# Patient Record
Sex: FEMALE | Race: WHITE | URBAN - METROPOLITAN AREA
[De-identification: names, ages, dates, MRNs, and addresses within clinical notes are randomized per-mention and may not be internally consistent; named-entity substitution may affect disease eponyms.]

---

## 2017-08-21 ENCOUNTER — THERAPY VISIT (OUTPATIENT)
Dept: CHIROPRACTIC MEDICINE | Facility: CLINIC | Age: 52
End: 2017-08-21
Payer: COMMERCIAL

## 2017-08-21 DIAGNOSIS — M77.11 LATERAL EPICONDYLITIS OF RIGHT ELBOW: ICD-10-CM

## 2017-08-21 DIAGNOSIS — M99.02 THORACIC SEGMENT DYSFUNCTION: Primary | ICD-10-CM

## 2017-08-21 DIAGNOSIS — M79.10 MYALGIA: ICD-10-CM

## 2017-08-21 DIAGNOSIS — M25.521 RIGHT ELBOW PAIN: ICD-10-CM

## 2017-08-21 PROCEDURE — 99212 OFFICE O/P EST SF 10 MIN: CPT | Mod: 25 | Performed by: CHIROPRACTOR

## 2017-08-21 PROCEDURE — 97110 THERAPEUTIC EXERCISES: CPT | Performed by: CHIROPRACTOR

## 2017-08-21 PROCEDURE — 98940 CHIROPRACT MANJ 1-2 REGIONS: CPT | Mod: AT | Performed by: CHIROPRACTOR

## 2017-08-21 NOTE — MR AVS SNAPSHOT
After Visit Summary   8/21/2017    Emily Leon    MRN: [id number]           Patient Information     Date Of Birth          1965        Visit Information        Provider Department      8/21/2017 11:45 AM José Luis Galicia DC [facility] [place] Chirop[facility]        Today's Diagnoses     Thoracic segment dysfunction    -  1    Right elbow pain        Myalgia        Lateral epicondylitis of right elbow           Follow-ups after your visit        Your next 10 appointments already scheduled     Aug 29, 2017  9:30 AM CDT   KIMANI Chiropractor with José Luis Galicia DC   [facility] [place] Chirop[facility] ([facility] [place])    [address] [place] [address]  [address]  [place] [address]   [phone]            Sep 05, 2017  8:45 AM CDT   KIMANI Chiropractor with José Luis Galicia DC   [facility] [place] ([facility] [place])    [address] [place] [address]  [id number]  [place] [address]   [phone]              Who to contact     If you have questions or need follow up information about today's clinic visit or your schedule please [facility] directly at [phone].  Normal or non-critical lab and imaging results will be communicated to you by [employer], letter or phone within 4 business days after the clinic has received the results. If you do not hear from us within 7 days, please contact the clinic through [employer] or phone. If you have a critical or abnormal lab result, we will notify you by phone as soon as possible.  Submit refill requests through [employer] or call your pharmacy and they will forward the refill request to us. Please allow 3 business days for your refill to be completed.          Additional Information About Your Visit        [employer] Information     [employer] lets you send messages to your doctor, view your test results, renew your prescriptions,  "schedule appointments and more. To sign up, go to [email or web address] . Click on \"Log in\" on the left side of the screen, which will take you to the Welcome page. Then click on \"Sign up Now\" on the right side of the page.     You will be asked to enter the access code listed below, as well as some personal information. Please follow the directions to create your username and password.     Your access code is: [email or web address]  Expires: 2017  8:27 PM     Your access code will  in 90 days. If you need help or a new code, please call your [facility] clinic or [phone].        Care EveryWhere ID     This is your Care EveryWhere ID. This could be used by other organizations to access your [facility] medical records  [id number]         Blood Pressure from Last 3 Encounters:   No data found for BP    Weight from Last 3 Encounters:   14 55.8 kg (123 lb)              We Performed the Following     CHIROPRAC MANIP,SPINAL,1-2 REGIONS     OFFICE/OUTPT VISIT,EST,LEVL II     THERAPEUTIC EXERCISES        Primary Care Provider    None Specified       No primary provider on file.        Equal Access to Services     [facility]: Hadii amari Clay, georgiada ramiro, qaromita kaalmamichelle brady, merlin pino . So [facility] [phone].    ATENCIÓN: Si habla español, tiene a silver disposición servicios gratuitos de asistencia lingüística. Llame al [phone].    We comply with applicable federal civil rights laws and [place] laws. We do not discriminate on the basis of race, color, national origin, age, disability sex, sexual orientation or gender identity.            Thank you!     Thank you for choosing [place] [facility] CHIROPRACTOR  for your care. Our goal is always to provide you with excellent care. Hearing back from our patients is one way we can continue to improve our services. Please take a few minutes to complete the written survey that you " may receive in the mail after your visit with us. Thank you!             Your Updated Medication List - Protect others around you: Learn how to safely use, store and throw away your medicines at www.disposemymeds.org.      Notice  As of 8/21/2017  8:27 PM    You have not been prescribed any medications.

## 2017-08-22 NOTE — PROGRESS NOTES
Subjective:  CC: Right elbow pain  Medications: anti-depressants handled medically  Visit: 1/6, previous patient  Goal: Be able to play tennis pain free  Location: Right lateral elbow  JOHN: no specific incident, gradual onset, sore for about 6 months  Pain: 2/10 VAS on average, worse with gripping and elbow motion  Previous History: None  Progression: Getting worse, began 6 months ago  Quality: Sharp  Radiation: Denies  Setting better / worse: worse with gripping  Timing: frequent pain  Under care:  Has not seen anyone else for this  ROS: ACL B knees  Notes history of depression but is being treated for that currently and doing well  Family history: history of cancer   Social: alert, oriented, and in good spirits. Very active and enjoys tennis and works out most days. 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     Objective:  Inspection:  No SDD  No Scars  Normal Gait     Palpation:  No specific pain  Palpable soreness over right lateral elbow  Myofascitis 2/4 noted over right wrist extensor     ROM:  Lumbar flexion   90/90, tightness lower back  Lumbar extension  30/30, general lower back discomfort   Right Hip IR  4/45  Left Hip IR  12/45  Right Hip ER  45/60  Left  hip ER  49/60  Shoulder flexion slightly limited 3 degrees with no pain on right  Shoulder abduction full with early trap fire on right  Wrist flexion full with lateral elbow discomfort   Right shoulder IR limited 9 degrees on right compared to left with posterior shoulder discomfort   Cervical ROM full and pain free     MMT:  Left glute med 5/5 with no pain  Right glute med 4/5 with no pain  Left TFL 5/5 with no pain  Right TFL 4/5 with no pain  Left piriformis 5/5 with no pain  Right piriformis 5/5 with no pain  Wrist extensors R 4/5 with lateral elbow discomfort  Shoulder ER and supra 4/5 with posterior elbow discomfort      MET:  Left short leg  Left superior pubic bone     Squat:  Shift to right  Foot flare to right  Arm drop on right     Lunge:  Right knee  genu valgum  Right knee cross over      NAL:  T3 RR with LRR  Restricted posterior capsule on right shoulder  Restricted radial head on right     Neuro:  Able to toe walk and heel walk  C5-T1 light touch WNL  MMT C5, C6, C7, C8, T1 5/5 Bilateral      Ortho:  Passive wrist flexion (Pain lateral elbow)     Assessment:  NAL with associated myofascitis and weakness  Lateral epicondylosis      Plan:  1.  Decrease pain 50%   2. Restore symmetric hip IR   3. Restore symmetric shoulder IR  4. Strengthen hip stabilizers to 5/5 B  5. Restore pelvic leveling  6. Restore segmental motion  7. Active care program focusing on hip mobility, shoulder mobility, core strength, glute strength, rotator cuff strength      Patient was given detailed history, review of symptoms, examination, functional examination, and report of findings. After this patient was treated with chiropractic adjustments, manual therapy, and therapeutic exercise. Patient tolerated treatment well. Patients treatment plan with be 2 times per week for 2 weeks followed by 1 time per week for 2 weeks. Following treatment plan a follow up exam will be done to make sure patient is improving. Treatment frequency will degrease as patients subjective complaints improve as well as objective findings. Prognosis for care is good based on fact that patient is active and is willing to take active approach in care.      Patient tolerated treatment well today  Treatment Time: 45 minutes  45515 manipulation 1-2 segments: MET, supine thoracic   15447 manipulation: Manual extremity shoulder mobilizations   22168 Manual therapy: (ART, Graston, Strain Counter Strain, Fascial Manipulation, Cupping) performed over area of right elbow extensors  96242 therapeutic exercise (30 minutes):   1. Eccentric Wrist Extensors with dumbbell, dumbbell turns, and brachioradialis eccentrics  Next visit: 1 week  Other: Shock wave over right elbow (intensity 3, 10 mm head)

## 2017-08-29 ENCOUNTER — THERAPY VISIT (OUTPATIENT)
Dept: CHIROPRACTIC MEDICINE | Facility: CLINIC | Age: 52
End: 2017-08-29
Payer: COMMERCIAL

## 2017-08-29 DIAGNOSIS — M77.11 LATERAL EPICONDYLITIS OF RIGHT ELBOW: ICD-10-CM

## 2017-08-29 DIAGNOSIS — M79.10 MYALGIA: ICD-10-CM

## 2017-08-29 DIAGNOSIS — M25.521 RIGHT ELBOW PAIN: ICD-10-CM

## 2017-08-29 DIAGNOSIS — M99.02 THORACIC SEGMENT DYSFUNCTION: Primary | ICD-10-CM

## 2017-08-29 PROCEDURE — 97110 THERAPEUTIC EXERCISES: CPT | Performed by: CHIROPRACTOR

## 2017-08-29 PROCEDURE — 98940 CHIROPRACT MANJ 1-2 REGIONS: CPT | Mod: AT | Performed by: CHIROPRACTOR

## 2017-08-29 NOTE — MR AVS SNAPSHOT
"              After Visit Summary   8/29/2017    Emily Leon    MRN: [id number]           Patient Information     Date Of Birth          1965        Visit Information        Provider Department      8/29/2017 5:00 PM José Luis Galicia DC [facility] [place] Chiropractor        Today's Diagnoses     Thoracic segment dysfunction    -  1    Right elbow pain        Myalgia        Lateral epicondylitis of right elbow           Follow-ups after your visit        Your next 10 appointments already scheduled     Sep 05, 2017  8:45 AM CDT   [facility] Chiropractor with José Luis Galicia DC   [facility] [place] Chiropractor ([facility]IM[facility] [place])    [address] [place] [address]  [address]  [place] [address] [phone]              Who to contact     If you have questions or need follow up information about today's clinic visit or your schedule please contact [facility] [place] directly at [phone].  Normal or non-critical lab and imaging results will be communicated to you by [employer], letter or phone within 4 business days after the clinic has received the results. If you do not hear from us within 7 days, please contact the clinic through [employer] or phone. If you have a critical or abnormal lab result, we will notify you by phone as soon as possible.  Submit refill requests through [employer] or call your pharmacy and they will forward the refill request to us. Please allow 3 business days for your refill to be completed.          Additional Information About Your Visit        [employer] Information     [employer] lets you send messages to your doctor, view your test results, renew your prescriptions, schedule appointments and more. To sign up, go to [email or web address] . Click on \"Log in\" on the left side of the screen, which will take you to the Welcome page. Then click on \"Sign up Now\" on the right side of the page.     You will " be asked to enter the access code listed below, as well as some personal information. Please follow the directions to create your username and password.     Your access code is: [email or web address]  Expires: 2017  8:27 PM     Your access code will  in 90 days. If you need help or a new code, please call your [place] clinic or [phone].        Care EveryWhere ID     This is your Care EveryWhere ID. This could be used by other organizations to access your [place] medical records  [id number]         Blood Pressure from Last 3 Encounters:   No data found for BP    Weight from Last 3 Encounters:   14 55.8 kg (123 lb)              We Performed the Following     CHIROPRAC MANIP,SPINAL,1-2 REGIONS     THERAPEUTIC EXERCISES        Primary Care Provider    None Specified       No primary provider on file.        Equal Access to Services     [facility]: Hadii amari Clay, waclementina rubio, qaraman kaalmamichelle brady, merlin pino . So [facility] [phone].    ATENCIÓN: Si habla español, tiene a silver disposición servicios gratuitos de asistencia lingüística. Henry al [phone].    We comply with applicable federal civil rights laws and [place] laws. We do not discriminate on the basis of race, color, national origin, age, disability sex, sexual orientation or gender identity.            Thank you!     Thank you for choosing [facility]  for your care. Our goal is always to provide you with excellent care. Hearing back from our patients is one way we can continue to improve our services. Please take a few minutes to complete the written survey that you may receive in the mail after your visit with us. Thank you!             Your Updated Medication List - Protect others around you: Learn how to safely use, store and throw away your medicines at www.disposemymeds.org.      Notice  As of 2017  8:50 PM    You have not been  prescribed any medications.

## 2017-08-30 NOTE — PROGRESS NOTES
Recommend to see Bill or Iftikhar while I am gone. Have only did 2 treatment session for lateral elbow pain focusing on manual therapy and shock wave 10/4). Gave side lying shoulder external rotation, door turns, eccentric extensors, and eccentric brachioradialis.       Subjective:  CC: Right elbow pain  Medications: anti-depressants handled medically  Visit: 2/6, previous patient  Goal: Be able to play tennis pain free  Location: Right lateral elbow  JOHN: no specific incident, gradual onset, sore for about 6 months  Pain: 2/10 VAS on average, worse with gripping and elbow motion  Comes in today doing well. Was not sore after last session. Notes is still playing tennis and denies any new issues. Most pain over right lateral elbow. Denies any radiating pain.      Objective:  Inspection:  No SDD  No Scars  Normal Gait     Palpation:  No specific pain  Palpable soreness over right lateral elbow  Myofascitis 2/4 noted over right wrist extensor     ROM:  Lumbar flexion   90/90, tightness lower back  Lumbar extension  30/30, general lower back discomfort   Right Hip IR  4/45  Left Hip IR  12/45  Right Hip ER  45/60  Left  hip ER  49/60  Shoulder flexion slightly limited 3 degrees with no pain on right  Shoulder abduction full with early trap fire on right  Wrist flexion full with lateral elbow discomfort   Right shoulder IR limited 9 degrees on right compared to left with posterior shoulder discomfort   Cervical ROM full and pain free     MMT:  Left glute med 5/5 with no pain  Right glute med 4/5 with no pain  Left TFL 5/5 with no pain  Right TFL 4/5 with no pain  Left piriformis 5/5 with no pain  Right piriformis 5/5 with no pain  Wrist extensors R 4/5 with lateral elbow discomfort  Shoulder ER and supra 4/5 with posterior elbow discomfort      Squat:  Shift to right  Foot flare to right  Arm drop on right     Lunge:  Right knee genu valgum  Right knee cross over      NAL:  T3 RR with LRR  Restricted posterior capsule on  right shoulder  Restricted radial head on right     Neuro:  Able to toe walk and heel walk  C5-T1 light touch WNL  MMT C5, C6, C7, C8, T1 5/5 Bilateral      Ortho:  Passive wrist flexion (Pain lateral elbow)     Assessment:  NAL with associated myofascitis and weakness  Lateral epicondylosis      Plan:  Patient tolerated treatment well today  Treatment Time: 45 minutes  62752 manipulation 1-2 segments: MET, supine thoracic   05816 manipulation: Manual extremity shoulder mobilizations   26226 Manual therapy: (ART, Graston, Strain Counter Strain, Fascial Manipulation, Cupping) performed over area of right elbow extensors  57319 therapeutic exercise (30 minutes):   1. Eccentric Wrist Extensors with dumbbell, dumbbell turns, and brachioradialis eccentrics, side lying shoulder ER X30   Next visit: 1 week  Other: Shock wave over right elbow (intensity 4, 10 mm head)

## 2018-08-20 ENCOUNTER — THERAPY VISIT (OUTPATIENT)
Dept: CHIROPRACTIC MEDICINE | Facility: CLINIC | Age: 53
End: 2018-08-20
Payer: COMMERCIAL

## 2018-08-20 DIAGNOSIS — M79.10 MYALGIA: ICD-10-CM

## 2018-08-20 DIAGNOSIS — S76.912A STRAIN OF LEFT HIP AND THIGH, INITIAL ENCOUNTER: ICD-10-CM

## 2018-08-20 DIAGNOSIS — M99.05 SOMATIC DYSFUNCTION OF PELVIS REGION: Primary | ICD-10-CM

## 2018-08-20 DIAGNOSIS — S76.012A STRAIN OF LEFT HIP AND THIGH, INITIAL ENCOUNTER: ICD-10-CM

## 2018-08-20 DIAGNOSIS — M25.552 HIP PAIN, LEFT: ICD-10-CM

## 2018-08-20 PROBLEM — M77.11 LATERAL EPICONDYLITIS OF RIGHT ELBOW: Status: RESOLVED | Noted: 2017-08-21 | Resolved: 2018-08-20

## 2018-08-20 PROBLEM — M99.02 THORACIC SEGMENT DYSFUNCTION: Status: RESOLVED | Noted: 2017-08-21 | Resolved: 2018-08-20

## 2018-08-20 PROBLEM — M25.521 RIGHT ELBOW PAIN: Status: RESOLVED | Noted: 2017-08-21 | Resolved: 2018-08-20

## 2018-08-20 PROCEDURE — 98940 CHIROPRACT MANJ 1-2 REGIONS: CPT | Mod: AT | Performed by: CHIROPRACTOR

## 2018-08-20 PROCEDURE — 99212 OFFICE O/P EST SF 10 MIN: CPT | Mod: AT | Performed by: CHIROPRACTOR

## 2018-08-20 PROCEDURE — 97110 THERAPEUTIC EXERCISES: CPT | Performed by: CHIROPRACTOR

## 2018-08-21 NOTE — PROGRESS NOTES
[place] [facility]  Aug 20, 2018    I think you re on vacation this week- yay! So you may not get this for a few days.  I m on vacation this week too- and had a little too much fun with my kids and nieces and nephews/ I was learning to  floss ? You might know this dance- where you move your hips really fast and your arms swing in opposite direction? ??  Well I moved my hips a little too fast and did something (awful). Felt a pop or snap - technically on my pelvis- always thought that was my hip. It s painful but the extra strength Motrin prescribed is helping. X-ray showed nothing broken. Not sure if you can help me.  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 Gives me 11 days. I m hoping it s possible.  > Right now you don t have any openings next week - Vince and I are both set up for Monday 8/27.  > You may not read this until after your vacation so if you have any last minute cancels or any possibilities please let me know.      Subjective:  Emily Leon   52 year old   female    CC: Left hip pain  Medications reviewed: prescribed anti inflammatory for this frm MD  Visit: 1/6  Goal: play in tournament this weekend  Location:  Left lateral hip   JOHN: flossing dance felt pop and tear over left hip, was very sore next day and went to MD  Pain: 4/10 on average  Previous History: have treated in past for elbow pain, did tear ACL couple years ago  Progression: getting better  Quality: tightness   Radiation: denies  Pain is worse with: lateral movement  Pain is better with: rest  Timing: frequent pain  Under care: has seen (2) MD's for this. Both diagnosed with sprain / strain  Imaging:x-ray was unremarkable, did show mild bone chip according to one MD that he thought was old  Social: alert, oriented, and active. Avid ,  with children and I have seen her in the past  No bruising at any point  Was able to walk fully      Objective:  Inspection:  No SDD  No Scars  Normal  Gait    Palpation:  No specific pain  Palpable soreness over left lateral hip, non specific  Myofascitis 2/4 noted over L TFL, L glute    ROM:  Lumbar flexion   90/90  Lumbar extension  30/30, mild lower back pain  Right Hip IR  21/45  Left Hip IR  24/45  Hip adduction slightly limited on left with lateral hip pain  Hip flexion full and pain free    MMT:  Left glute med 5/5 with no pain  Right glute med 5/5 with no pain  Left TFL 4/5 with lateral hip pain   Right TFL 5/5 with no pain  Left piriformis 4/5 with lateral hip pain  Right piriformis 5/5 with no pain    MET:  Right short leg      Squat:  Shift to right  Foot flare to right    NAL:  Restricted SI on L    Neuro:  Able to toe walk and heel walk  L4-S1 light touch WNL    Ortho:  SLR (-), SI compression (-), Kemps (-)    Assessment:  NAL with associated myofascitis and weakness  Hip pain  Hip strain (TFL)    Plan:   Decrease pain 50%    Restore symmetric hip abduction    Strengthen hip stabilizers to 5/5 B   Restore pelvic leveling   Restore segmental motion   Functional goals in history    Patient was given detailed history, review of symptoms, examination, functional examination, and report of findings. After this patient was treated with chiropractic adjustments, manual therapy, and therapeutic exercise. Patient tolerated treatment well. Patients treatment plan with be 2 times per week for 2 weeks followed by 1 time per week for 2 weeks. Following treatment plan a follow up exam will be done to make sure patient is improving. Treatment frequency will degrease as patients subjective complaints improve as well as objective findings. Prognosis for care is good based on fact that patient is active and is willing to take active approach in care.     Patient tolerated treatment well today  Treatment Time: 45 minutes  35745 manipulation 1-2 segments: MET, Hip LAD  73354 Manual therapy: (ART, Graston, Strain Counter Strain, Fascial Manipulation, Cupping) performed  over area of left TFL, L psoas, L glute med  25049 therapeutic exercise (30 minutes): isometric holds  Strapping: hip LAD  Next visit: 1 week  Other:      José Luis Galicia DC, MEd, ATC

## 2018-08-23 ENCOUNTER — THERAPY VISIT (OUTPATIENT)
Dept: CHIROPRACTIC MEDICINE | Facility: CLINIC | Age: 53
End: 2018-08-23
Payer: COMMERCIAL

## 2018-08-23 DIAGNOSIS — S76.912A STRAIN OF LEFT HIP AND THIGH, INITIAL ENCOUNTER: ICD-10-CM

## 2018-08-23 DIAGNOSIS — M79.10 MYALGIA: ICD-10-CM

## 2018-08-23 DIAGNOSIS — M25.552 HIP PAIN, LEFT: ICD-10-CM

## 2018-08-23 DIAGNOSIS — M99.05 SOMATIC DYSFUNCTION OF PELVIS REGION: ICD-10-CM

## 2018-08-23 DIAGNOSIS — S76.012A STRAIN OF LEFT HIP AND THIGH, INITIAL ENCOUNTER: ICD-10-CM

## 2018-08-23 PROCEDURE — 97110 THERAPEUTIC EXERCISES: CPT | Performed by: CHIROPRACTOR

## 2018-08-23 PROCEDURE — 98940 CHIROPRACT MANJ 1-2 REGIONS: CPT | Mod: AT | Performed by: CHIROPRACTOR

## 2018-08-26 NOTE — PROGRESS NOTES
[place] [facility]  Aug 20, 2018      Subjective:  Emily Leon   52 year old   female    CC: Left hip pain  Medications reviewed: prescribed anti inflammatory for this frm MD  Visit: 2/6  Goal: play in tournament this weekend  Location:  Left lateral hip   JOHN: flossing dance felt pop and tear over left hip, was very sore next day and went to MD  Pain: 4/10 on average  Previous History: have treated in past for elbow pain, did tear ACL couple years ago  Progression: getting better  Quality: tightness   Radiation: denies  Pain is worse with: lateral movement  Pain is better with: rest  Timing: frequent pain  Under care: has seen (2) MD's for this. Both diagnosed with sprain / strain  Imaging:x-ray was unremarkable, did show mild bone chip according to one MD that he thought was old  Social: alert, oriented, and active. Avid ,  with children and I have seen her in the past  No bruising at any point  Was able to walk fully    Comes in today doing well. Notes that overall last treatment was very helpful. Was able to play tennis. She is playing in tournament this weekend. Notes tightness lateral hip. Denies any new issues. Working on active care and pleased with progress.     Objective:  Inspection:  No SDD  No Scars  Normal Gait    Palpation:  No specific pain  Palpable soreness over left lateral hip, non specific  Myofascitis 2/4 noted over L TFL, L glute    ROM:  Lumbar flexion   90/90  Lumbar extension  30/30, mild lower back pain  Right Hip IR  21/45  Left Hip IR  24/45  Hip adduction slightly limited on left with lateral hip pain  Hip flexion full and pain free    MMT:  Left glute med 5/5 with no pain  Right glute med 5/5 with no pain  Left TFL 4/5 with lateral hip pain   Right TFL 5/5 with no pain  Left piriformis 4/5 with lateral hip pain  Right piriformis 5/5 with no pain    MET:  Right short leg      Squat:  Shift to right  Foot flare to right    NAL:  Restricted SI on  L      Assessment:  NAL with associated myofascitis and weakness  Hip pain  Hip strain (TFL)    Plan:  Patient tolerated treatment well today  Treatment Time: 45 minutes  02459 manipulation 1-2 segments: MET, Hip LAD  64797 Manual therapy: (ART, Graston, Strain Counter Strain, Fascial Manipulation, Cupping) performed over area of left TFL, L psoas, L glute med  29113 therapeutic exercise (30 minutes): isometric holds, leg swings, TFL stretching, talked about dynamic latera shuffle before tournament  Strapping: hip LAD  Next visit: 1 week  US: 5 minutes over lateral hip   Other:      José Luis Galicia DC, MEd, ATC

## 2018-11-27 ENCOUNTER — THERAPY VISIT (OUTPATIENT)
Dept: CHIROPRACTIC MEDICINE | Facility: CLINIC | Age: 53
End: 2018-11-27
Payer: COMMERCIAL

## 2018-11-27 DIAGNOSIS — M25.511 RIGHT SHOULDER PAIN, UNSPECIFIED CHRONICITY: ICD-10-CM

## 2018-11-27 DIAGNOSIS — M99.02 THORACIC SEGMENT DYSFUNCTION: ICD-10-CM

## 2018-11-27 DIAGNOSIS — G89.29 CHRONIC PAIN OF LEFT KNEE: ICD-10-CM

## 2018-11-27 DIAGNOSIS — M79.10 MYALGIA: ICD-10-CM

## 2018-11-27 DIAGNOSIS — M99.05 SOMATIC DYSFUNCTION OF PELVIS REGION: Primary | ICD-10-CM

## 2018-11-27 DIAGNOSIS — M25.562 CHRONIC PAIN OF LEFT KNEE: ICD-10-CM

## 2018-11-27 PROBLEM — M25.552 HIP PAIN, LEFT: Status: RESOLVED | Noted: 2018-08-20 | Resolved: 2018-11-27

## 2018-11-27 PROBLEM — S76.912A: Status: RESOLVED | Noted: 2018-08-20 | Resolved: 2018-11-27

## 2018-11-27 PROBLEM — S76.012A: Status: RESOLVED | Noted: 2018-08-20 | Resolved: 2018-11-27

## 2018-11-27 PROCEDURE — 97110 THERAPEUTIC EXERCISES: CPT | Performed by: CHIROPRACTOR

## 2018-11-27 PROCEDURE — 98940 CHIROPRACT MANJ 1-2 REGIONS: CPT | Mod: AT | Performed by: CHIROPRACTOR

## 2018-11-27 PROCEDURE — 99211 OFF/OP EST MAY X REQ PHY/QHP: CPT | Mod: 25 | Performed by: CHIROPRACTOR

## 2018-11-27 NOTE — PROGRESS NOTES
[facility]  Aug 20, 2018      Subjective:  Emily Leon   53 year old   female    CC: R lateral and posterior shoulder, L medial knee  Medications reviewed: occasional NSAIDS, Bone health supplement  Visit: 11 (re-exam)  Goal: play 5 days a week of tennis   Location:  R posterior and lateral shoulder, L medial knee  JOHN: Denies any specific JOHN, gradual onset of pain over last month   Pain: 4/10 on average  Previous History: have treated in past for elbow pain, did tear B ACL couple years ago  Progression: not changing   Quality: tightness   Radiation: denies at any point  Pain is worse with: sleeping on side, tennis   Pain is better with: rest  Timing: frequent pain  Under care: has not seen anyone for this   Other: since last visit had another dexa scan and is no longer showing signs of osteoporosis. Notes that numbers are normal thanks to diet and exercise. Very pleased with that.         Objective:  Inspection:  No SDD  No Scars  Normal Gait    Palpation:  No specific pain  Palpable soreness over right shoulder, L medial knee  Myofascitis 2/4 noted over R levator, R upper trap, R supar, L medial retinaculum     ROM:  Lumbar flexion   90/90  Lumbar extension  30/30, mild lower back pain  Right Hip IR  21/45  Left Hip IR  24/45  Knee flexion full and symmetric with crepitus over medial patellar on left  Restricted tibial internal rotation on left with flexion  Shoulder adduction mild anterior shoulder pain on right with tightness noted posterior shoulder  Shoulder ER limited 8 degrees with no pain on right compared to left  Shoulder IR symmetric     MMT:  Left glute med 5/5 with no pain  Right glute med 5/5 with no pain  Left TFL 4/5 with lateral hip pain   Right TFL 5/5 with no pain  Left piriformis 4/5 with lateral hip pain  Right piriformis 5/5 with no pain  Quad 5/5 B with no pain  Hamstring 5/5 B with no pain  Shoulder ER 4/5 on right with posterior shoulder pain on right  Empty can  4/5 on right with superior shoulder pain     MET:  Right short leg  R superior first rib     Squat:  Shift to right  Foot flare to right  R arm drop     NAL:  T4 RR with LRR    Neuro: light touch C5-T1 WNL  Ortho: trell (-), bounce home (-), powell shoulder (mild pinching), empty can (pain)    Assessment:  NAL with associated myofascitis and weakness  Shoulder pain, possible supra tendinosis   Knee pain, possible patella femoral pain  Plan:  Patient tolerated treatment well today  Treatment Time: 45 minutes  11162 manipulation 1-2 segments: MET, prone thoracic mobilizations, patellar mobilizations  21054 Manual therapy: (ART, Graston, Strain Counter Strain, Fascial Manipulation, Cupping) performed over area of R upper trap, R levator, R supra, R infra, L medial retinaculum   79981 therapeutic exercise (30 minutes): shoulder co-contraction with shoulder shakes, prone T's, leg press  Next visit: 1 well  Plan: 3 visits over 3 weeks, previous patient who responds well to care  US: did not do      José Luis Galicia DC, MEd, ATC

## 2018-12-07 ENCOUNTER — THERAPY VISIT (OUTPATIENT)
Dept: CHIROPRACTIC MEDICINE | Facility: CLINIC | Age: 53
End: 2018-12-07
Payer: COMMERCIAL

## 2018-12-07 DIAGNOSIS — M79.10 MYALGIA: ICD-10-CM

## 2018-12-07 DIAGNOSIS — M25.511 RIGHT SHOULDER PAIN, UNSPECIFIED CHRONICITY: ICD-10-CM

## 2018-12-07 DIAGNOSIS — M99.05 SOMATIC DYSFUNCTION OF PELVIS REGION: ICD-10-CM

## 2018-12-07 DIAGNOSIS — M99.02 THORACIC SEGMENT DYSFUNCTION: ICD-10-CM

## 2018-12-07 PROCEDURE — 97110 THERAPEUTIC EXERCISES: CPT | Performed by: CHIROPRACTOR

## 2018-12-07 PROCEDURE — 98940 CHIROPRACT MANJ 1-2 REGIONS: CPT | Mod: AT | Performed by: CHIROPRACTOR

## 2018-12-07 NOTE — PROGRESS NOTES
[facility]  Aug 20, 2018      Subjective:  Emily Leon   53 year old   female    CC: R lateral and posterior shoulder, L medial knee  Medications reviewed: occasional NSAIDS, Bone health supplement  Visit: 2  Goal: play 5 days a week of tennis   Location:  R posterior and lateral shoulder, L medial knee  JOHN: Denies any specific JOHN, gradual onset of pain over last month   Pain: 4/10 on average  Previous History: have treated in past for elbow pain, did tear B ACL couple years ago  Progression: not changing   Quality: tightness   Radiation: denies at any point  Pain is worse with: sleeping on side, tennis   Pain is better with: rest  Timing: frequent pain  Under care: has not seen anyone for this       Comes in today doing OK. Working on active care. Shoulder is about the same. Knee is fine. Notes some B hip tightness. Still full active in tennis. Denies any other new symptoms besides the lateral hip tightness. Pleased with last session. Active care is going well.       Objective:  Inspection:  No SDD  No Scars  Normal Gait    Palpation:  No specific pain  Palpable soreness over right shoulder, L medial knee  Myofascitis 2/4 noted over R levator, R upper trap, R supar, L medial retinaculum     ROM:  Lumbar flexion   90/90  Lumbar extension  30/30, mild lower back pain  Right Hip IR  21/45  Left Hip IR  24/45  Knee flexion full and symmetric with crepitus over medial patellar on left  Restricted tibial internal rotation on left with flexion  Shoulder adduction mild anterior shoulder pain on right with tightness noted posterior shoulder  Shoulder ER limited 8 degrees with no pain on right compared to left  Shoulder IR symmetric     MMT:  Left glute med 5/5 with no pain  Right glute med 5/5 with no pain  Left TFL 4/5 with lateral hip pain   Right TFL 5/5 with no pain  Left piriformis 4/5 with lateral hip pain  Right piriformis 5/5 with no pain  Quad 5/5 B with no pain  Hamstring 5/5 B with no  pain  Shoulder ER 4/5 on right with posterior shoulder pain on right  Empty can 4/5 on right with superior shoulder pain     MET:  Right short leg  R superior first rib     Squat:  Shift to right  Foot flare to right  R arm drop     NAL:  T4 RR with LRR    Neuro: light touch C5-T1 WNL  Ortho: trell (-), bounce home (-), powell shoulder (mild pinching), empty can (pain)    Assessment:  NAL with associated myofascitis and weakness  Shoulder pain, possible supra tendinosis     Plan:  Patient tolerated treatment well today  Treatment Time: 45 minutes  06182 manipulation 1-2 segments: MET, prone thoracic mobilizations  94030 Manual therapy: (ART, Graston, Strain Counter Strain, Fascial Manipulation, Cupping) performed over area of R upper trap, R levator, R supra, R infra, L medial retinaculum   72760 therapeutic exercise (30 minutes): shoulder co-contraction with shoulder shakes, prone T's, leg press, seated shoulder ER, hip swings  Next visit: 1 week  Plan: 3 visits over 3 weeks, previous patient who responds well to care  Light therapy 4 minutes over right shoulder       José Luis Galicia DC, MEd, ATC

## 2018-12-13 ENCOUNTER — THERAPY VISIT (OUTPATIENT)
Dept: CHIROPRACTIC MEDICINE | Facility: CLINIC | Age: 53
End: 2018-12-13
Payer: COMMERCIAL

## 2018-12-13 DIAGNOSIS — M79.10 MYALGIA: ICD-10-CM

## 2018-12-13 DIAGNOSIS — M99.05 SOMATIC DYSFUNCTION OF PELVIS REGION: ICD-10-CM

## 2018-12-13 DIAGNOSIS — M99.02 THORACIC SEGMENT DYSFUNCTION: ICD-10-CM

## 2018-12-13 DIAGNOSIS — M25.511 RIGHT SHOULDER PAIN, UNSPECIFIED CHRONICITY: ICD-10-CM

## 2018-12-13 PROCEDURE — 97110 THERAPEUTIC EXERCISES: CPT | Performed by: CHIROPRACTOR

## 2018-12-13 PROCEDURE — 98940 CHIROPRACT MANJ 1-2 REGIONS: CPT | Mod: AT | Performed by: CHIROPRACTOR

## 2018-12-13 NOTE — PROGRESS NOTES
[facility]  Aug 20, 2018      Subjective:  Emily Leon   53 year old   female    CC: R lateral and posterior shoulder, L medial knee  Medications reviewed: occasional NSAIDS, Bone health supplement  Visit: 3  Goal: play 5 days a week of tennis   Location:  R posterior and lateral shoulder, L medial knee  JOHN: Denies any specific JOHN, gradual onset of pain over last month   Pain: 4/10 on average  Previous History: have treated in past for elbow pain, did tear B ACL couple years ago  Progression: not changing   Quality: tightness   Radiation: denies at any point  Pain is worse with: sleeping on side, tennis   Pain is better with: rest  Timing: frequent pain  Under care: has not seen anyone for this       Comes in today doing OK. Working on active care. Shoulder is about the same. Knee is fine. Notes some B hip tightness but much better than last session. Still full active in tennis. Has been playing full go.  Pleased with last session. Active care is going well. Notes shoulder is going well. Denies any changes in health history.       Objective:  Inspection:  No SDD  No Scars  Normal Gait    Palpation:  No specific pain  Palpable soreness over right shoulder, L medial knee  Myofascitis 2/4 noted over R levator, R upper trap, R supar, L medial retinaculum     ROM:  Lumbar flexion   90/90  Lumbar extension  30/30, mild lower back pain  Right Hip IR  21/45  Left Hip IR  24/45  Knee flexion full and symmetric with crepitus over medial patellar on left  Restricted tibial internal rotation on left with flexion  Shoulder adduction mild anterior shoulder pain on right with tightness noted posterior shoulder  Shoulder ER limited 8 degrees with no pain on right compared to left  Shoulder IR symmetric     MMT:  Left glute med 5/5 with no pain  Right glute med 5/5 with no pain  Left TFL 4/5 with lateral hip pain   Right TFL 5/5 with no pain  Left piriformis 4/5 with lateral hip pain  Right piriformis  5/5 with no pain  Quad 5/5 B with no pain  Hamstring 5/5 B with no pain  Shoulder ER 4/5 on right with posterior shoulder pain on right  Empty can 4/5 on right with superior shoulder pain     MET:  Right short leg  R superior first rib     Squat:  Shift to right  Foot flare to right  R arm drop     NAL:  T4 RR with LRR    Neuro: light touch C5-T1 WNL  Ortho: trell (-), bounce home (-), powell shoulder (mild pinching), empty can (pain)    Assessment:  NAL with associated myofascitis and weakness  Shoulder pain, possible supra tendinosis     Plan:  Patient tolerated treatment well today  Treatment Time: 45 minutes  10865 manipulation 1-2 segments: MET, prone thoracic mobilizations  23828 Manual therapy: (ART, Graston, Strain Counter Strain, Fascial Manipulation, Cupping) performed over area of R upper trap, R levator, R supra, R infra, L medial retinaculum   19466 therapeutic exercise (30 minutes): shoulder co-contraction with shoulder shakes, prone T's, leg press, seated shoulder ER, hip swings  Side lying ER ball drops 3X15, standing cans 2lbs, continued shoulder shakes  Next visit: 2-3 weeks  Light therapy 4 minutes over right shoulder ( did not do)      José Luis Galicia DC, MEd, ATC